# Patient Record
Sex: MALE | NOT HISPANIC OR LATINO
[De-identification: names, ages, dates, MRNs, and addresses within clinical notes are randomized per-mention and may not be internally consistent; named-entity substitution may affect disease eponyms.]

---

## 2021-05-08 ENCOUNTER — NON-APPOINTMENT (OUTPATIENT)
Age: 61
End: 2021-05-08

## 2021-05-08 DIAGNOSIS — Z78.9 OTHER SPECIFIED HEALTH STATUS: ICD-10-CM

## 2021-05-08 DIAGNOSIS — Z84.1 FAMILY HISTORY OF DISORDERS OF KIDNEY AND URETER: ICD-10-CM

## 2021-05-08 DIAGNOSIS — Z83.438 FAMILY HISTORY OF OTHER DISORDER OF LIPOPROTEIN METABOLISM AND OTHER LIPIDEMIA: ICD-10-CM

## 2021-05-08 DIAGNOSIS — D75.1 SECONDARY POLYCYTHEMIA: ICD-10-CM

## 2021-05-08 DIAGNOSIS — Z86.69 PERSONAL HISTORY OF OTHER DISEASES OF THE NERVOUS SYSTEM AND SENSE ORGANS: ICD-10-CM

## 2021-05-08 PROBLEM — Z00.00 ENCOUNTER FOR PREVENTIVE HEALTH EXAMINATION: Status: ACTIVE | Noted: 2021-05-08

## 2021-05-08 RX ORDER — CYCLOBENZAPRINE HCL 10 MG
10 TABLET ORAL
Refills: 0 | Status: ACTIVE | COMMUNITY

## 2021-05-11 ENCOUNTER — APPOINTMENT (OUTPATIENT)
Dept: GASTROENTEROLOGY | Facility: CLINIC | Age: 61
End: 2021-05-11
Payer: COMMERCIAL

## 2021-05-11 ENCOUNTER — NON-APPOINTMENT (OUTPATIENT)
Age: 61
End: 2021-05-11

## 2021-05-11 VITALS
BODY MASS INDEX: 26.07 KG/M2 | TEMPERATURE: 98.2 F | HEART RATE: 80 BPM | HEIGHT: 68 IN | SYSTOLIC BLOOD PRESSURE: 118 MMHG | WEIGHT: 172 LBS | DIASTOLIC BLOOD PRESSURE: 80 MMHG

## 2021-05-11 DIAGNOSIS — K64.8 OTHER HEMORRHOIDS: ICD-10-CM

## 2021-05-11 DIAGNOSIS — Z12.11 ENCOUNTER FOR SCREENING FOR MALIGNANT NEOPLASM OF COLON: ICD-10-CM

## 2021-05-11 DIAGNOSIS — Z82.61 FAMILY HISTORY OF ARTHRITIS: ICD-10-CM

## 2021-05-11 DIAGNOSIS — Z80.42 FAMILY HISTORY OF MALIGNANT NEOPLASM OF PROSTATE: ICD-10-CM

## 2021-05-11 DIAGNOSIS — Z83.3 FAMILY HISTORY OF DIABETES MELLITUS: ICD-10-CM

## 2021-05-11 PROCEDURE — 99072 ADDL SUPL MATRL&STAF TM PHE: CPT

## 2021-05-11 PROCEDURE — 99204 OFFICE O/P NEW MOD 45 MIN: CPT

## 2021-05-11 NOTE — REVIEW OF SYSTEMS
[Red Eyes] : eyes not red [Dysuria] : no dysuria [Skin Lesions] : no skin lesions [Confused] : no confusion [Suicidal] : not suicidal [Proptosis] : no proptosis [Easy Bruising] : no tendency for easy bruising

## 2021-05-11 NOTE — ASSESSMENT
[FreeTextEntry1] : \par \par \par \par \par \par \par \par 1. Hemorrhoids:  well - controlled.  No pain,  swelling,  itch,  bleeding\par * Discussed the potential complications of thrombosis,  pain,  infection,  swelling, itching,  bleeding \par Recommendations: \par * High - Fiber Diet was reviewed and emphasized\par * 6  --  8 cups of decaffeinated fluid daily was emphasized \par * Sitz Bathes BID,  No:  Anusol HC  Suppos / Cream  IA BID -- was needed\par * No:  Tucks BID,   No:  Balneol Lotion,   No:  Calmoseptine Oint -- was needed ;    ++ Prep H prn\par * No:  need for  Colorectal surgical evaluation for possible ablation w Dr --  was emphasized\par \par \par \par \par \par 2. Hemorrhoids:  well - controlled.  No pain,  swelling,  itch,  bleeding\par * Discussed the potential complications of thrombosis,  pain,  infection,  swelling, itching,  bleeding \par Recommendations: \par * High - Fiber Diet was reviewed and emphasized\par * 6  --  8 cups of decaffeinated fluid daily was emphasized \par * Sitz Bathes BID,  No:  Anusol HC  Suppos / Cream  IA BID -- was needed\par * No:  Tucks BID,   No:  Balneol Lotion,   No:  Calmoseptine Oint -- was needed ;    ++ Prep H prn\par * No:  need for  Colorectal surgical evaluation for possible ablation w Dr --  was emphasized\par \par \par \par \par Informed Consent:\par * The risks & Benefits of EGD  /  Colonoscopy were discussed w patient.\par * This included but was not limited to perforation, bleeding, sedation /med rxns possibly requiring surgery, blood transfusions, antibiotics & CPR/Intubation.\par * Pt. understands & agrees to the procedures.\par The following instructions in regards to the prep and medically essential ( cardiac, pulmonary, sz, psych, endocrine)  pre-op medication administration\par was reviewed and emphasized with the patient . \par * Pt. advised to D/C  ASA/NSAIDs  7  Days  &  Plavix, Warfarin,  4  -  5  Days  PTP.\par * [ +++ ]  Dulcolax / Miralax / Mag. Citrate ,  [     ] Prepopik/ Clenpiq ,  [     ] Osmo Prep,  [    ] GoLytely,  prep. reviewed w Pt.\par * Hold  [           ] AM of procedure.\par * Hold  [           ] PM of procedure.\par * Take  [           ] PM of procedure.\par * Take  [           ] AM of procedure.\par \par

## 2021-05-11 NOTE — HISTORY OF PRESENT ILLNESS
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, Trigem neuralagia\par Hemorrhoids\par \par Today: Feeling well, no c/o , CP, SOB/ TRUONG, Cough, Wheeze, Palpitations, edema\par reviewed endoscopic findings and pathology in detail with patient\par patients questioned answered: Colonoscopy 11yrs ago w hemorrhoids\par \par * Abd pain-no\par * Nausea-no\par * Vomit-no\par * Early satiety--no\par * Belching-no\par * Hiccups--no\par * Regurgitation-no\par * Acid Taste / Water Brash-no\par * Ht burn-no\par * Throat Clearing-no\par * Post-Nasal Drip-no\par * Congestion-no\par * Globus-no\par * Cough-no\par * Wheeze / PC--no\par * Hoarseness-no\par * Dysphagia-no\par * BMs: # 4 qd\par * Constipation-no\par * Diarrhea-no\par * Bloating-no\par * Strain on Defecation--no\par * Incompl Evac--no\par * Flatulence-no\par * Gurgling-no\par * Melena-no\par * BPBPR-no\par * Anorexia-no\par * Wt. Loss-no\par \par \par

## 2021-05-14 ENCOUNTER — RESULT REVIEW (OUTPATIENT)
Age: 61
End: 2021-05-14

## 2021-05-16 ENCOUNTER — RESULT REVIEW (OUTPATIENT)
Age: 61
End: 2021-05-16

## 2021-05-17 ENCOUNTER — APPOINTMENT (OUTPATIENT)
Dept: GASTROENTEROLOGY | Facility: HOSPITAL | Age: 61
End: 2021-05-17